# Patient Record
Sex: FEMALE | Race: BLACK OR AFRICAN AMERICAN | URBAN - METROPOLITAN AREA
[De-identification: names, ages, dates, MRNs, and addresses within clinical notes are randomized per-mention and may not be internally consistent; named-entity substitution may affect disease eponyms.]

---

## 2022-05-27 ENCOUNTER — OFFICE VISIT (OUTPATIENT)
Dept: INTERNAL MEDICINE CLINIC | Age: 32
End: 2022-05-27
Payer: COMMERCIAL

## 2022-05-27 VITALS
RESPIRATION RATE: 20 BRPM | OXYGEN SATURATION: 98 % | HEART RATE: 101 BPM | HEIGHT: 67 IN | SYSTOLIC BLOOD PRESSURE: 128 MMHG | BODY MASS INDEX: 38.6 KG/M2 | TEMPERATURE: 98.6 F | WEIGHT: 245.9 LBS | DIASTOLIC BLOOD PRESSURE: 77 MMHG

## 2022-05-27 DIAGNOSIS — G93.31 POSTVIRAL FATIGUE SYNDROME: ICD-10-CM

## 2022-05-27 DIAGNOSIS — R05.9 COUGH: Primary | ICD-10-CM

## 2022-05-27 DIAGNOSIS — L72.0 EPIDERMAL INCLUSION CYST: ICD-10-CM

## 2022-05-27 DIAGNOSIS — Z86.16 PERSONAL HISTORY OF COVID-19: ICD-10-CM

## 2022-05-27 DIAGNOSIS — E28.2 PCOS (POLYCYSTIC OVARIAN SYNDROME): ICD-10-CM

## 2022-05-27 DIAGNOSIS — J31.0 CHRONIC RHINITIS: ICD-10-CM

## 2022-05-27 DIAGNOSIS — R06.2 WHEEZING: ICD-10-CM

## 2022-05-27 DIAGNOSIS — Z83.2 FAMILY HISTORY OF SARCOIDOSIS: ICD-10-CM

## 2022-05-27 DIAGNOSIS — Z76.89 ESTABLISHING CARE WITH NEW DOCTOR, ENCOUNTER FOR: ICD-10-CM

## 2022-05-27 DIAGNOSIS — Z91.09 ENVIRONMENTAL ALLERGIES: ICD-10-CM

## 2022-05-27 DIAGNOSIS — Z79.899 ENCOUNTER FOR LONG-TERM (CURRENT) USE OF OTHER MEDICATIONS: ICD-10-CM

## 2022-05-27 PROCEDURE — 99204 OFFICE O/P NEW MOD 45 MIN: CPT | Performed by: FAMILY MEDICINE

## 2022-05-27 RX ORDER — MINERAL OIL
180 ENEMA (ML) RECTAL DAILY
COMMUNITY

## 2022-05-27 RX ORDER — NORETHINDRONE ACETATE AND ETHINYL ESTRADIOL 1MG-20(21)
1 KIT ORAL DAILY
COMMUNITY
Start: 2022-04-12 | End: 2022-06-13 | Stop reason: SDUPTHER

## 2022-05-27 RX ORDER — PREDNISONE 5 MG/1
TABLET ORAL
Qty: 21 TABLET | Refills: 0 | Status: SHIPPED | OUTPATIENT
Start: 2022-05-27 | End: 2022-06-13 | Stop reason: ALTCHOICE

## 2022-05-27 RX ORDER — FEXOFENADINE HCL 60 MG
180 TABLET ORAL DAILY
COMMUNITY
End: 2022-05-27

## 2022-05-27 NOTE — PROGRESS NOTES
Rm    Chief Complaint   Patient presents with    New Patient     est care    Allergies        Visit Vitals  /77 (BP 1 Location: Left upper arm, BP Patient Position: Sitting, BP Cuff Size: Large adult)   Pulse (!) 101   Temp 98.6 °F (37 °C) (Oral)   Resp 20   Ht 5' 7\" (1.702 m)   Wt 245 lb 14.4 oz (111.5 kg)   SpO2 98%   BMI 38.51 kg/m²        1. Have you been to the ER, urgent care clinic since your last visit? Hospitalized since your last visit? No    2. Have you seen or consulted any other health care providers outside of the [address] [place] [address] since your last visit? Include any pap smears or colon screening. No     Health Maintenance Due   Topic Date Due    Hepatitis C Screening  Never done    Depression Screen  Never done    COVID-19 Vaccine (1) Never done    Cervical cancer screen  Never done        3 most recent PHQ Screens 5/27/2022   Little interest or pleasure in doing things Not at all   Feeling down, depressed, irritable, or hopeless Not at all   Total Score PHQ 2 0        No flowsheet data found. No flowsheet data found.

## 2022-05-27 NOTE — PROGRESS NOTES
[facility]  Cesilia Parr MD  [address] [address]    Chief Complaint   Patient presents with    New Patient     est care    Allergies       SUBJECTIVE:    Narciso Harris is a 28 y.o. female for care establishment and evaluation of allergies. worsening allergy sx  Nasal congestion sneezing runny nose occasional wheezing-rare  Using Allegra 24, afrin- not overusing afrin  No acute sx. denies fever, chills,  facial pressure , headache  Trigger mgt ok  Allergy testing over 10 yr ago- dust, grass, other common allergens  No ent evaluation for possible nasal polyps    covid in January  Lingering symptoms include fatigue and coughing  Denies fever chills chest pain shortness of breath  Normal activities  Smokes marijuana  Mother  from sarcoidosis complications. Patient concerned about possible sarcoidosis       Knot on back x 1 yr  No change in size, no pain or irritation    December fall injured left leg, swelling subsided, c/w discoloration in area of injury    pcos history, irregular menstruation    Current Outpatient Medications   Medication Sig Dispense Refill    fexofenadine (ALLEGRA) 180 mg tablet Take 180 mg by mouth daily.  predniSONE (STERAPRED) 5 mg dose pack See administration instruction per 5mg dose pack 21 Tablet 0    Blisovi Fe /, 28, 1 mg-20 mcg (21)/75 mg (7) tab Take 1 Tablet by mouth daily. Past Medical History:   Diagnosis Date    Contact dermatitis and eczema due to cause     urticaria     History reviewed. No pertinent surgical history.   No Known Allergies    REVIEW OF SYSTEMS:  General: negative for - chills or fever  ENT: nasal congestionnegative for - headaches, , tinnitus, hearing loss, vision changes, sore throat  Respiratory: cough, wheezing; negative for - hemoptysis, shortness of breath or   Cardiovascular : negative for - chest pain, edema, palpitations or shortness of breath  Gastrointestinal: negative for - abdominal pain, blood in stools, heartburn or nausea/vomiting, diarrhea, constipation  Genito-Urinary: + Irregular menstruation ; no dysuria, trouble voiding, hematuria   Musculoskeletal: negative for - gait disturbance, joint pain, joint stiffness , joint swelling, muscle aches  Neurological: no TIA or stroke symptoms  Hematologic: no bruises, no bleeding, no swollen glands  Integument: +lumps, no mole changes, nail changes or rash  Endocrine:+/lethargy; no malaise or unexpected weight changes      Social History     Socioeconomic History    Marital status: SINGLE   Occupational History    Occupation:    Tobacco Use    Smoking status: Never Smoker    Smokeless tobacco: Never Used   Substance and Sexual Activity    Alcohol use: Yes     Alcohol/week: 2.0 standard drinks     Types: 2 Glasses of wine per week    Drug use: No    Sexual activity: Yes     Partners: Male     Birth control/protection: Condom     Family History   Problem Relation Age of Onset    Thyroid Cancer Mother     Heart Disease Father        OBJECTIVE:     Visit Vitals  /77 (BP 1 Location: Left upper arm, BP Patient Position: Sitting, BP Cuff Size: Large adult)   Pulse (!) 101   Temp 98.6 °F (37 °C) (Oral)   Resp 20   Ht 5' 7\" (1.702 m)   Wt 245 lb 14.4 oz (111.5 kg)   SpO2 98%   BMI 38.51 kg/m²     CONSTITUTIONAL: Pleasant cooperative in no acute distress  EYES: perrla, eom intact  ENMT:moist mucous membranes, pharynx: rt tonsil stone  NECK: supple. Thyroid normal  RESPIRATORY: Chest: clear bilaterally  CARDIOVASCULAR: Heart: regular rate and rhythm, pulse 2+   GASTROINTESTINAL: Abdomen: soft, bowel sounds active  HEMATOLOGIC: no pathological lymph nodes palpated  MUSCULOSKELETAL: Extremities: no edema,   INTEGUMENT: Nontender nonfluctuant SQ cystic lesion in upper back; no unusual rashes. Nails appear normal.  NEUROLOGIC: non-focal exam   MENTAL STATUS: alert and oriented, appropriate affect     ASSESSMENT/PLAN:  1. Cough    2.  PCOS (polycystic ovarian syndrome)    3. Encounter for long-term (current) use of other medications    4. Family history of sarcoidosis    5. Wheezing    6. Chronic rhinitis    7. Postviral fatigue syndrome    8. Environmental allergies    9. Personal history of COVID-19    10. Establishing care with new doctor, encounter for    11. Epidermal inclusion cyst      Cough is likely multifactorial given history is of allergic rhinitis, recent COVID infection and smoking history. Family history of sarcoidosis. Check ACE level. Patient defers on chest x-ray. Treat cough symptomatically. Smoking cessation will be helpful. Fatigue following COVID. Check labs and reassure. Epidermal inclusion cyst  Observe for now. Refer to Derm as needed. .  Orders Placed This Encounter    ANGIOTENSIN CONVERTING ENZYME    HEMOGLOBIN A1C WITH EAG    CBC WITH AUTOMATED DIFF    METABOLIC PANEL, COMPREHENSIVE    URINALYSIS W/ RFLX MICROSCOPIC    Blisovi Fe 1/20, 28, 1 mg-20 mcg (21)/75 mg (7) tab    DISCONTD: fexofenadine (ALLEGRA) 60 mg tablet    fexofenadine (ALLEGRA) 180 mg tablet    predniSONE (STERAPRED) 5 mg dose pack       Follow-up and Dispositions    · Return if symptoms worsen or fail to improve. I have discussed the diagnosis with the patient and the intended plan as seen in the  orders above. The patient understands and agrees with the plan. The patient has   received an after visit summary. Questions were answered concerning  future plans  Patient labs and/or xrays were reviewed as available. Past records were reviewed as available. Counseled regarding healthy lifestyle        Advised patient to  call back or return to office if symptoms develop/worsen/change/persist.  Discussed expected course/resolution/complications of diagnosis in detail with patient. Medication risks/benefits/costs/interactions/alternatives discussed with patient    Carrie Montes M.D.       This note was created using voice recognition software.   Edits have been made but syntax errors might exist.

## 2022-05-27 NOTE — PATIENT INSTRUCTIONS
Allergies: Care Instructions  Overview     Allergies occur when your body's defense system (immune system) overreacts to certain substances. The immune system treats a harmless substance as if it were a harmful germ or virus. Many things can make this happen. These include pollens, medicine, food, dust, animal dander, and mold. Allergies can be mild or severe. Mild allergies can be managed with home treatment. But medicine may be needed to prevent problems. Managing your allergies is an important part of staying healthy. Your doctor may suggest that you have allergy testing to help find out what is causing your allergies. Severe allergies can cause reactions that affect your whole body (anaphylactic reactions). Your doctor may prescribe a shot of epinephrine to carry with you in case you have a severe reaction. Learn how to give yourself the shot and keep it with you at all times. Make sure it is not . Follow-up care is a key part of your treatment and safety. Be sure to make and go to all appointments, and call your doctor if you are having problems. It's also a good idea to know your test results and keep a list of the medicines you take. How can you care for yourself at home? · If you have been told by your doctor that dust or dust mites are causing your allergy, decrease the dust around your bed:  ? Wash sheets, pillowcases, and other bedding in hot water every week. ? Use dust-proof covers for pillows, duvets, and mattresses. Avoid plastic covers because they tear easily and do not \"breathe. \" Wash as instructed on the label. ? Do not use any blankets and pillows that you do not need. ? Use blankets that you can wash in your washing machine. ? Consider removing drapes and carpets, which attract and hold dust, from your bedroom. · If you are allergic to house dust and mites, do not use home humidifiers. Your doctor can suggest ways you can control dust and mites.   · Look for signs of cockroaches. Cockroaches cause allergic reactions. Use cockroach baits to get rid of them. Then, clean your home well. Cockroaches like areas where grocery bags, newspapers, empty bottles, or cardboard boxes are stored. Do not keep these inside your home, and keep trash and food containers sealed. Seal off any spots where cockroaches might enter your home. · If you are allergic to mold, get rid of furniture, rugs, and drapes that smell musty. Check for mold in the bathroom. · If you are allergic to outdoor pollen or mold spores, use air-conditioning. Change or clean all filters every month. Keep windows closed. · If you are allergic to pollen, stay inside when pollen counts are high. Use a vacuum  with a HEPA filter or a double-thickness filter at least two times each week. · Stay inside when air pollution is bad. Avoid paint fumes, perfumes, and other strong odors. · Avoid conditions that make your allergies worse. Stay away from smoke. Do not smoke or let anyone else smoke in your house. Do not use fireplaces or wood-burning stoves. · If you are allergic to your pets, change the air filter in your furnace every month. Use high-efficiency filters. · If you are allergic to pet dander, keep pets outside or out of your bedroom. Old carpet and cloth furniture can hold a lot of animal dander. You may need to replace them. When should you call for help? Give an epinephrine shot if:    · You think you are having a severe allergic reaction.     · You have symptoms in more than one body area, such as mild nausea and an itchy mouth. After giving an epinephrine shot call 911, even if you feel better. Call 911 if:    · You have symptoms of a severe allergic reaction. These may include:  ? Sudden raised, red areas (hives) all over your body. ? Swelling of the throat, mouth, lips, or tongue. ? Trouble breathing. ? Passing out (losing consciousness).  Or you may feel very lightheaded or suddenly feel weak, confused, or restless.     · You have been given an epinephrine shot, even if you feel better. Call your doctor now or seek immediate medical care if:    · You have symptoms of an allergic reaction, such as:  ? A rash or hives (raised, red areas on the skin). ? Itching. ? Swelling. ? Belly pain, nausea, or vomiting. Watch closely for changes in your health, and be sure to contact your doctor if:    · You do not get better as expected. Where can you learn more? Go to http://www.barros.com/  Enter W171 in the search box to learn more about \"Allergies: Care Instructions. \"  Current as of: February 10, 2021               Content Version: 13.2  © 2006-2022 [employer]. Care instructions adapted under license by [employer] (which disclaims liability or warranty for this information). If you have questions about a medical condition or this instruction, always ask your healthcare professional. [place] [address] any warranty or liability for your use of this information. Prednisone (By mouth)   Prednisone (PRED-ni-sone)  Treats many diseases and conditions, especially problems related to inflammation. This medicine is a corticosteroid. Brand Name(s): Contrast Allergy PreMed Pack, Jazmyne, predniSONE Intensol   There may be other brand names for this medicine. When This Medicine Should Not Be Used: This medicine is not right for everyone. Do not use if you had an allergic reaction to prednisone or if you are pregnant. How to Use This Medicine:   Liquid, Tablet, Delayed Release Tablet  · Take your medicine as directed. Your dose may need to be changed several times to find what works best for you. · It is best to take this medicine with food or milk. · Swallow the delayed-release tablet whole. Do not crush, break, or chew it. · Measure the oral liquid medicine with a marked measuring spoon, oral syringe, or medicine cup. · Missed dose:  Take a dose as soon as you remember. If it is almost time for your next dose, wait until then and take a regular dose. Do not take extra medicine to make up for a missed dose. · Store the medicine in a closed container at room temperature, away from heat, moisture, and direct light. Do not freeze the oral liquid. Drugs and Foods to Avoid:   Ask your doctor or pharmacist before using any other medicine, including over-the-counter medicines, vitamins, and herbal products. · Tell your doctor if you use any of the following:  ¨ Aminoglutethimide, amphotericin B, carbamazepine, cholestyramine, cyclosporine, digoxin, isoniazid, ketoconazole, phenobarbital, phenytoin, or rifampin  ¨ Blood thinner, such as warfarin  ¨ NSAID pain or arthritis medicine, such as aspirin, diclofenac, ibuprofen, naproxen, celecoxib  ¨ Diuretic (water pill)  ¨ Diabetes medicine  ¨ Macrolide antibiotic, such as azithromycin, clarithromycin, erythromycin  ¨ Estrogen, including birth control pills or hormone replacement therapy  · This medicine may interfere with vaccines. Ask your doctor before you get a flu shot or any other vaccines. Warnings While Using This Medicine:   · It is not safe to take this medicine during pregnancy. It could harm an unborn baby. Tell your doctor right away if you become pregnant. · Tell your doctor if you are breastfeeding or if you have kidney problems, heart failure, high blood pressure, a recent heart attack, diabetes, glaucoma, osteoporosis, or thyroid problems. Tell your doctor about any infection you have. Also tell your doctor if you have had mental or emotional problems (such as depression) or stomach or bowel problems (such as an ulcer or diverticulitis).   · This medicine may cause the following problems:  ¨ Mood or behavior changes  ¨ Higher blood pressure, retaining water, changes in salt or potassium levels in your body  ¨ Cataracts or glaucoma (with long-term use)  ¨ Weak bones or osteoporosis (with long-term use)  ¨ Slow growth in children (with long-term use)  ¨ Muscle problems (with high doses, especially if you have myasthenia gravis or similar nerve and muscle problems)  · Do not stop using this medicine suddenly. Your doctor will need to slowly decrease your dose before you stop it completely. · This medicine could cause you to get infections more easily. Tell your doctor right away if you are exposed to chicken pox, measles, or other serious infection. Tell your doctor if you had a serious infection in the past, such as tuberculosis or herpes. · Tell your doctor about any extra stress or anxiety in your life. Your dose might need to be changed for a short time. · Tell any doctor or dentist who treats you that you are using this medicine. This medicine may affect certain medical test results. · Keep all medicine out of the reach of children. Never share your medicine with anyone. Possible Side Effects While Using This Medicine:   Call your doctor right away if you notice any of these side effects:  · Allergic reaction: Itching or hives, swelling in your face or hands, swelling or tingling in your mouth or throat, chest tightness, trouble breathing  · Dark freckles, skin color changes, coldness, weakness, tiredness, nausea, vomiting, weight loss  · Depression, unusual thoughts, feelings, or behaviors, trouble sleeping  · Fever, chills, cough, sore throat, and body aches  · Muscle pain or weakness  · Rapid weight gain, swelling in your hands, ankles, or feet  · Severe stomach pain, nausea, vomiting, or red or black stools  · Skin changes or growths  · Trouble seeing, eye pain, headache  If you notice these less serious side effects, talk with your doctor:   · Increased appetite  · Round, puffy face  · Weight gain around your neck, upper back, breast, face, or waist  If you notice other side effects that you think are caused by this medicine, tell your doctor.    Call your doctor for medical advice about side effects. You may report side effects to FDA at 8-026-NFO-0584  © 2017 [facility] Information is for End User's use only and may not be sold, redistributed or otherwise used for commercial purposes. The above information is an  only. It is not intended as medical advice for individual conditions or treatments. Talk to your doctor, nurse or pharmacist before following any medical regimen to see if it is safe and effective for you.

## 2022-05-28 LAB
ALBUMIN SERPL-MCNC: 4 G/DL (ref 3.5–5)
ALBUMIN/GLOB SERPL: 1.1 {RATIO} (ref 1.1–2.2)
ALP SERPL-CCNC: 72 U/L (ref 45–117)
ALT SERPL-CCNC: 31 U/L (ref 12–78)
ANION GAP SERPL CALC-SCNC: 7 MMOL/L (ref 5–15)
APPEARANCE UR: CLEAR
AST SERPL-CCNC: 24 U/L (ref 15–37)
BASOPHILS # BLD: 0.1 K/UL (ref 0–0.1)
BASOPHILS NFR BLD: 1 % (ref 0–1)
BILIRUB SERPL-MCNC: 0.3 MG/DL (ref 0.2–1)
BILIRUB UR QL: NEGATIVE
BUN SERPL-MCNC: 9 MG/DL (ref 6–20)
BUN/CREAT SERPL: 12 (ref 12–20)
CALCIUM SERPL-MCNC: 9.3 MG/DL (ref 8.5–10.1)
CHLORIDE SERPL-SCNC: 103 MMOL/L (ref 97–108)
CO2 SERPL-SCNC: 26 MMOL/L (ref 21–32)
COLOR UR: NORMAL
CREAT SERPL-MCNC: 0.75 MG/DL (ref 0.55–1.02)
DIFFERENTIAL METHOD BLD: NORMAL
EOSINOPHIL # BLD: 0.4 K/UL (ref 0–0.4)
EOSINOPHIL NFR BLD: 5 % (ref 0–7)
ERYTHROCYTE [DISTWIDTH] IN BLOOD BY AUTOMATED COUNT: 14.2 % (ref 11.5–14.5)
EST. AVERAGE GLUCOSE BLD GHB EST-MCNC: 117 MG/DL
GLOBULIN SER CALC-MCNC: 3.7 G/DL (ref 2–4)
GLUCOSE SERPL-MCNC: 90 MG/DL (ref 65–100)
GLUCOSE UR STRIP.AUTO-MCNC: NEGATIVE MG/DL
HBA1C MFR BLD: 5.7 % (ref 4–5.6)
HCT VFR BLD AUTO: 45.4 % (ref 35–47)
HGB BLD-MCNC: 14.3 G/DL (ref 11.5–16)
HGB UR QL STRIP: NEGATIVE
IMM GRANULOCYTES # BLD AUTO: 0 K/UL (ref 0–0.04)
IMM GRANULOCYTES NFR BLD AUTO: 0 % (ref 0–0.5)
KETONES UR QL STRIP.AUTO: NEGATIVE MG/DL
LEUKOCYTE ESTERASE UR QL STRIP.AUTO: NEGATIVE
LYMPHOCYTES # BLD: 2.2 K/UL (ref 0.8–3.5)
LYMPHOCYTES NFR BLD: 26 % (ref 12–49)
MCH RBC QN AUTO: 28 PG (ref 26–34)
MCHC RBC AUTO-ENTMCNC: 31.5 G/DL (ref 30–36.5)
MCV RBC AUTO: 88.8 FL (ref 80–99)
MONOCYTES # BLD: 0.7 K/UL (ref 0–1)
MONOCYTES NFR BLD: 9 % (ref 5–13)
NEUTS SEG # BLD: 4.9 K/UL (ref 1.8–8)
NEUTS SEG NFR BLD: 59 % (ref 32–75)
NITRITE UR QL STRIP.AUTO: NEGATIVE
NRBC # BLD: 0 K/UL (ref 0–0.01)
NRBC BLD-RTO: 0 PER 100 WBC
PH UR STRIP: 5.5 [PH] (ref 5–8)
PLATELET # BLD AUTO: 352 K/UL (ref 150–400)
PMV BLD AUTO: 9.5 FL (ref 8.9–12.9)
POTASSIUM SERPL-SCNC: 4.3 MMOL/L (ref 3.5–5.1)
PROT SERPL-MCNC: 7.7 G/DL (ref 6.4–8.2)
PROT UR STRIP-MCNC: NEGATIVE MG/DL
RBC # BLD AUTO: 5.11 M/UL (ref 3.8–5.2)
SODIUM SERPL-SCNC: 136 MMOL/L (ref 136–145)
SP GR UR REFRACTOMETRY: 1.01 (ref 1–1.03)
UROBILINOGEN UR QL STRIP.AUTO: 0.2 EU/DL (ref 0.2–1)
WBC # BLD AUTO: 8.3 K/UL (ref 3.6–11)

## 2022-05-29 LAB — ACE SERPL-CCNC: 37 U/L (ref 14–82)

## 2022-06-13 ENCOUNTER — OFFICE VISIT (OUTPATIENT)
Dept: OBGYN CLINIC | Age: 32
End: 2022-06-13
Payer: COMMERCIAL

## 2022-06-13 VITALS
DIASTOLIC BLOOD PRESSURE: 80 MMHG | SYSTOLIC BLOOD PRESSURE: 128 MMHG | BODY MASS INDEX: 37.98 KG/M2 | WEIGHT: 242 LBS | HEIGHT: 67 IN

## 2022-06-13 DIAGNOSIS — Z01.419 ENCOUNTER FOR GYNECOLOGICAL EXAMINATION WITHOUT ABNORMAL FINDING: Primary | ICD-10-CM

## 2022-06-13 PROCEDURE — 99385 PREV VISIT NEW AGE 18-39: CPT | Performed by: ADVANCED PRACTICE MIDWIFE

## 2022-06-13 RX ORDER — NORETHINDRONE ACETATE AND ETHINYL ESTRADIOL 1MG-20(21)
1 KIT ORAL DAILY
Qty: 3 DOSE PACK | Refills: 4 | Status: SHIPPED | OUTPATIENT
Start: 2022-06-13

## 2022-06-13 NOTE — PROGRESS NOTES
Annual exam ages 21-44    Ezio Fuchs is a No obstetric history on file. ,  28 y.o. female   No LMP recorded. She presents for her annual checkup. She is having no significant problems. Hx PCOS well controlled with Cont dosing OCP    With regard to the Gardasil vaccine, she declines to receive it. Menstrual status:    Her periods are absent in flow. She is using no pads or tampons per day, very light to non existent due to contraceptive hormones. She does not have dysmenorrhea. She reports no premenstrual symptoms. Contraception:    The current method of family planning is OCP. Continuous    Sexual history:    She  reports being sexually active and has had partner(s) who are male. She reports using the following method of birth control/protection: Condom. Medical conditions:    Since her last annual GYN exam about one year ago, she has had the following changes in her health history: none. Surgical history confirmed with patient. has no past surgical history on file. Pap and Mammogram History:    Her most recent Pap smear was normal, obtained 1 year(s) ago. The patient Had a Mammo ~ 3 yrs ago, normal, no f/u needed. Breast Cancer History/Substance Abuse: negative/Paternal Great Aunt BrCa    Past Medical History:   Diagnosis Date    Contact dermatitis and eczema due to cause     urticaria     History reviewed. No pertinent surgical history. Current Outpatient Medications   Medication Sig Dispense Refill    Blisovi Fe 1/20, 28, 1 mg-20 mcg (21)/75 mg (7) tab Take 1 Tablet by mouth daily.  fexofenadine (ALLEGRA) 180 mg tablet Take 180 mg by mouth daily.  predniSONE (STERAPRED) 5 mg dose pack See administration instruction per 5mg dose pack (Patient not taking: Reported on 6/13/2022) 21 Tablet 0     Allergies: Patient has no known allergies. Tobacco History:  reports that she has never smoked.  She has never used smokeless tobacco.  Alcohol Abuse:  reports current alcohol use of about 2.0 standard drinks of alcohol per week. Drug Abuse:  reports no history of drug use. Family Medical/Cancer History:   Family History   Problem Relation Age of Onset    Thyroid Cancer Mother     Heart Disease Father         Review of Systems - History obtained from the patient  Constitutional: negative for weight loss, fever, night sweats  HEENT: negative for hearing loss, earache, congestion, snoring, sorethroat  CV: negative for chest pain, palpitations, edema  Resp: negative for cough, shortness of breath, wheezing  GI: negative for change in bowel habits, abdominal pain, black or bloody stools  : negative for frequency, dysuria, hematuria, vaginal discharge  MSK: negative for back pain, joint pain, muscle pain  Breast: negative for breast lumps, nipple discharge, galactorrhea  Skin :negative for itching, rash, hives  Neuro: negative for dizziness, headache, confusion, weakness  Psych: negative for anxiety, depression, change in mood  Heme/lymph: negative for bleeding, bruising, pallor    Physical Exam    Visit Vitals  /80   Ht 5' 7\" (1.702 m)   Wt 242 lb (109.8 kg)   BMI 37.90 kg/m²       Constitutional  · Appearance: well-nourished, well developed, alert, in no acute distress    HENT  · Head and Face: appears normal,   · Eyes: Sclera clear. Conjunctiva pink, no drainage.  PEARLA      Neck  · Inspection/Palpation: normal appearance, no masses or tenderness  · Lymph Nodes: no lymphadenopathy present  · Thyroid: NSSC, NT    Chest  · Respiratory Effort: breathing unlabored  · Auscultation: Slight Wheezes throughout    Cardiovascular  · Heart:  · Auscultation: regular rate and rhythm without murmur    Breasts  · Inspection of Breasts: breasts symmetrical, no skin changes, no discharge present, nipple appearance normal, no skin retraction present  · Palpation of Breasts and Axillae: no masses present on palpation, no breast tenderness  · Axillary Lymph Nodes: no lymphadenopathy present    Gastrointestinal  · Abdominal Examination: abdomen non-tender to palpation, no masses present  · Hernias: no hernias identified  · CVA: Neg/NT Bilat      Skin  · General Inspection: no rash, no lesions identified    Neurologic/Psychiatric  · Mental Status:  · Orientation: grossly oriented to person, place and time  · Mood and Affect: mood normal, affect appropriate    . Assessment:  Routine gynecologic examination  Her current medical status is satisfactory with no evidence of significant gynecologic issues. Bilat Wheezes, reports has appt with PCP severe asthma  Plan:  Declines Gardasil and STI testing  Blisovi 3 pks 3 RF (continuous dosing)  Counseled re: diet, exercise, healthy lifestyle, safe sex practices and STI prevention  Offered LARC for Contraception  Return for yearly wellness visits or prn  Gardisil counseling provided  Pt counseled regarding co-testing for high risk HPV with pap  Mammogram Screening recommendation starting age 36   Colorectal Screening recommendation starting age 39    [address]

## 2022-06-13 NOTE — PATIENT INSTRUCTIONS
Well Visit, Ages 25 to 48: Care Instructions  Overview     Well visits can help you stay healthy. Your doctor has checked your overall health and may have suggested ways to take good care of yourself. Your doctor also may have recommended tests. At home, you can help prevent illness with healthy eating, regular exercise, and other steps. Follow-up care is a key part of your treatment and safety. Be sure to make and go to all appointments, and call your doctor if you are having problems. It's also a good idea to know your test results and keep a list of the medicines you take. How can you care for yourself at home? · Get screening tests that you and your doctor decide on. Screening helps find diseases before any symptoms appear. · Eat healthy foods. Choose fruits, vegetables, whole grains, protein, and low-fat dairy foods. Limit fat, especially saturated fat. Reduce salt in your diet. · Limit alcohol. If you are a man, have no more than 2 drinks a day or 14 drinks a week. If you are a woman, have no more than 1 drink a day or 7 drinks a week. · Get at least 30 minutes of physical activity on most days of the week. Walking is a good choice. You also may want to do other activities, such as running, swimming, cycling, or playing tennis or team sports. Discuss any changes in your exercise program with your doctor. · Reach and stay at a healthy weight. This will lower your risk for many problems, such as obesity, diabetes, heart disease, and high blood pressure. · Do not smoke or allow others to smoke around you. If you need help quitting, talk to your doctor about stop-smoking programs and medicines. These can increase your chances of quitting for good. · Care for your mental health. It is easy to get weighed down by worry and stress. Learn strategies to manage stress, like deep breathing and mindfulness, and stay connected with your family and community.  If you find you often feel sad or hopeless, talk with your doctor. Treatment can help. · Talk to your doctor about whether you have any risk factors for sexually transmitted infections (STIs). You can help prevent STIs if you wait to have sex with a new partner (or partners) until you've each been tested for STIs. It also helps if you use condoms (male or female condoms) and if you limit your sex partners to one person who only has sex with you. Vaccines are available for some STIs, such as HPV. · Use birth control if it's important to you to prevent pregnancy. Talk with your doctor about the choices available and what might be best for you. · If you think you may have a problem with alcohol or drug use, talk to your doctor. This includes prescription medicines (such as amphetamines and opioids) and illegal drugs (such as cocaine and methamphetamine). Your doctor can help you figure out what type of treatment is best for you. · Protect your skin from too much sun. When you're outdoors from 10 a.m. to 4 p.m., stay in the shade or cover up with clothing and a hat with a wide brim. Wear sunglasses that block UV rays. Even when it's cloudy, put broad-spectrum sunscreen (SPF 30 or higher) on any exposed skin. · See a dentist one or two times a year for checkups and to have your teeth cleaned. · Wear a seat belt in the car. When should you call for help? Watch closely for changes in your health, and be sure to contact your doctor if you have any problems or symptoms that concern you. Where can you learn more? Go to http://www.[employer].com/  Enter P072 in the search box to learn more about \"Well Visit, Ages 25 to 48: Care Instructions. \"  Current as of: October 6, 2021               Content Version: 13.2  © 0871-0391 [employer], [employer]. Care instructions adapted under license by "[employer] (which disclaims liability or warranty for this information).  If you have questions about a medical condition or this instruction, always ask your healthcare professional. [place] [address] any warranty or liability for your use of this information.

## 2022-06-24 ENCOUNTER — OFFICE VISIT (OUTPATIENT)
Dept: INTERNAL MEDICINE CLINIC | Age: 32
End: 2022-06-24
Payer: COMMERCIAL

## 2022-06-24 VITALS
DIASTOLIC BLOOD PRESSURE: 87 MMHG | TEMPERATURE: 98.4 F | HEART RATE: 92 BPM | RESPIRATION RATE: 16 BRPM | HEIGHT: 67 IN | OXYGEN SATURATION: 98 % | BODY MASS INDEX: 38.67 KG/M2 | WEIGHT: 246.4 LBS | SYSTOLIC BLOOD PRESSURE: 124 MMHG

## 2022-06-24 DIAGNOSIS — Z00.00 WELLNESS EXAMINATION: Primary | ICD-10-CM

## 2022-06-24 PROCEDURE — 99395 PREV VISIT EST AGE 18-39: CPT | Performed by: FAMILY MEDICINE

## 2022-06-24 NOTE — PROGRESS NOTES
Chief Complaint   Patient presents with    Physical         1. Have you been to the ER, urgent care clinic since your last visit? Hospitalized since your last visit? No    2. Have you seen or consulted any other health care providers outside of the [address] [place] [address] since your last visit? Include any pap smears or colon screening.  No

## 2022-06-24 NOTE — PROGRESS NOTES
[facility]  Francisco Parr MD  [address]  [id number]    Chief Complaint   Patient presents with    Physical       SUBJECTIVE:    Tatianna Nava is a 28 y.o. female for cpe. LMP : amenorrheic while taking ocp  Last Pap (q 3 years, or q5 with HPV) :   Hx of abnl Pap - No  Last Mammogram (50-74 biennially): yes  Hx abnl mammogram: no    Contraception: ocp    1. Do you follow a low fat diet? yes  2. Are you up to date on your Tdap (<10 years)?  no  3. Have you ever had a Pneumovax vaccine (>65)  na              PCV13               PPSV23   4. Have you had Zoster vaccine (>50)? na  5. Have you had the HPV - Gardasil (13- 46)? no  6. Have you had a hepatitis C virus screening test? no  7. Do you smoke?  no  8. Do you consider yourself overweight?  yes  9. Is there a family history of CAD< age 48? no   Dad age 61 ami  8. Is there a family history of Cancer?  no  11. Do you know your Cancer risks?  no  12. Have you had a colonoscopy? (45-74yo)? na  13. Have you been tested for HIV or other STI's?  (18-71 y/o)? yes  14. Have had a bone density scan or DEXA done(>65)? na  15. Have you had an EKG performed in the last five years (>50)? na     Current Outpatient Medications   Medication Sig Dispense Refill    Blisovi Fe 1/20, 28, 1 mg-20 mcg (21)/75 mg (7) tab Take 1 Tablet by mouth daily. Indications: polycystic ovarian syndrome, a disease with cysts in the ovaries 3 Dose Pack 4    fexofenadine (ALLEGRA) 180 mg tablet Take 180 mg by mouth daily. Past Medical History:   Diagnosis Date    Contact dermatitis and eczema due to cause     urticaria     History reviewed. No pertinent surgical history.   No Known Allergies    REVIEW OF SYSTEMS:  General: negative for - chills or fever  ENT: negative for - headaches, nasal congestion, tinnitus, hearing loss, vision changes, sore throat  Respiratory: negative for - cough, hemoptysis, shortness of breath or wheezing  Cardiovascular : negative for - chest pain, edema, palpitations or shortness of breath  Gastrointestinal: negative for - abdominal pain, blood in stools, heartburn or nausea/vomiting, diarrhea, constipation  Genito-Urinary: no dysuria, trouble voiding, hematuria   Musculoskeletal: negative for - gait disturbance, joint pain, joint stiffness , joint swelling, muscle aches  Neurological: no TIA or stroke symptoms  Hematologic: no bruises, no bleeding, no swollen glands  Integument: no lumps, mole changes, nail changes or rash  Endocrine:no malaise/lethargy or unexpected weight changes      Social History     Socioeconomic History    Marital status: SINGLE   Occupational History    Occupation:    Tobacco Use    Smoking status: Never Smoker    Smokeless tobacco: Never Used   Substance and Sexual Activity    Alcohol use: Yes     Alcohol/week: 2.0 standard drinks     Types: 2 Glasses of wine per week    Drug use: No    Sexual activity: Yes     Partners: Male     Birth control/protection: Condom     Family History   Problem Relation Age of Onset    Thyroid Cancer Mother     Heart Disease Father        OBJECTIVE:     Visit Vitals  /87   Pulse 92   Temp 98.4 °F (36.9 °C) (Oral)   Resp 16   Ht 5' 7\" (1.702 m)   Wt 246 lb 6.4 oz (111.8 kg)   LMP  (LMP Unknown)   SpO2 98%   BMI 38.59 kg/m²     General appearance: alert, cooperative, no distress, appears stated age  Head: Normocephalic, without obvious abnormality, atraumatic  Eyes: conjunctivae/corneas clear. PERRL, EOM's intact. Ears: normal TM's and external ear canals AU  Nose: Nares normal. Septum midline. Mucosa normal. No drainage or sinus tenderness. Throat: Lips, mucosa, and tongue normal. Teeth and gums normal  Neck: supple, symmetrical, trachea midline, no adenopathy, thyroid: not enlarged, symmetric, no tenderness/mass/nodules, no carotid bruit and no JVD  Back: negative, symmetric, no curvature.  ROM normal. No CVA tenderness. Lungs: clear to auscultation bilaterally  Breasts: not examined  Heart: regular rate and rhythm, S1, S2 normal, no murmur, click, rub or gallop  Abdomen: soft, non-tender. Bowel sounds normal. No masses,  no organomegaly  Pelvic: deferred  Extremities: extremities normal, atraumatic, no cyanosis or edema  Pulses: 2+ and symmetric  Skin: Skin color, texture, turgor normal. No rashes or lesions  Lymph nodes: Cervical, supraclavicular, and axillary nodes normal.  Neurologic: Alert and oriented X 3, normal strength and tone. Normal symmetric reflexes. Normal coordination and gait      ASSESSMENT/PLAN:  1. Wellness examination    2. BMI 38.0-38.9,adult      . Follow-up and Dispositions    · Return in about 1 year (around 6/24/2023) for annual wellness exam.        I have discussed the diagnosis with the patient and the intended plan as seen in the  orders above. The patient understands and agrees with the plan. The patient has   received an after visit summary. Questions were answered concerning  future plans  Patient labs and/or xrays were reviewed as available. Past records were reviewed as available. Counseled regarding diet, exercise and healthy lifestyle        Signed,  Jake Zhou M.D. This note was created using voice recognition software.   Edits have been made but syntax errors might exist.

## 2023-05-23 RX ORDER — FEXOFENADINE HCL 180 MG/1
180 TABLET ORAL DAILY
COMMUNITY

## 2023-05-23 RX ORDER — NORETHINDRONE ACETATE AND ETHINYL ESTRADIOL 1MG-20(21)
1 KIT ORAL DAILY
COMMUNITY
Start: 2022-06-13

## 2023-08-17 RX ORDER — NORETHINDRONE ACETATE AND ETHINYL ESTRADIOL 1MG-20(21)
KIT ORAL
Qty: 84 TABLET | OUTPATIENT
Start: 2023-08-17

## 2024-06-15 VITALS
BODY MASS INDEX: 39.38 KG/M2 | WEIGHT: 250.88 LBS | HEART RATE: 100 BPM | TEMPERATURE: 98.7 F | SYSTOLIC BLOOD PRESSURE: 151 MMHG | OXYGEN SATURATION: 100 % | DIASTOLIC BLOOD PRESSURE: 95 MMHG | HEIGHT: 67 IN | RESPIRATION RATE: 16 BRPM

## 2024-06-15 PROCEDURE — 93005 ELECTROCARDIOGRAM TRACING: CPT | Performed by: EMERGENCY MEDICINE

## 2024-06-15 PROCEDURE — 99283 EMERGENCY DEPT VISIT LOW MDM: CPT

## 2024-06-15 ASSESSMENT — PAIN DESCRIPTION - FREQUENCY: FREQUENCY: CONTINUOUS

## 2024-06-15 ASSESSMENT — PAIN DESCRIPTION - PAIN TYPE: TYPE: ACUTE PAIN

## 2024-06-15 ASSESSMENT — PAIN DESCRIPTION - ORIENTATION: ORIENTATION: MID

## 2024-06-15 ASSESSMENT — PAIN DESCRIPTION - ONSET: ONSET: SUDDEN

## 2024-06-15 ASSESSMENT — PAIN DESCRIPTION - LOCATION: LOCATION: ABDOMEN;CHEST

## 2024-06-15 ASSESSMENT — PAIN DESCRIPTION - DESCRIPTORS: DESCRIPTORS: PRESSURE

## 2024-06-15 ASSESSMENT — PAIN - FUNCTIONAL ASSESSMENT
PAIN_FUNCTIONAL_ASSESSMENT: ACTIVITIES ARE NOT PREVENTED
PAIN_FUNCTIONAL_ASSESSMENT: 0-10

## 2024-06-15 ASSESSMENT — PAIN SCALES - GENERAL: PAINLEVEL_OUTOF10: 6

## 2024-06-16 ENCOUNTER — HOSPITAL ENCOUNTER (EMERGENCY)
Facility: HOSPITAL | Age: 34
Discharge: HOME | End: 2024-06-16
Attending: EMERGENCY MEDICINE

## 2024-06-16 DIAGNOSIS — R09.A2 GLOBUS SENSATION: Primary | ICD-10-CM

## 2024-06-16 LAB
EKG ATRIAL RATE: 99 BPM
EKG DIAGNOSIS: NORMAL
EKG P AXIS: 73 DEGREES
EKG P-R INTERVAL: 128 MS
EKG Q-T INTERVAL: 350 MS
EKG QRS DURATION: 74 MS
EKG QTC CALCULATION (BAZETT): 449 MS
EKG R AXIS: 68 DEGREES
EKG T AXIS: 59 DEGREES
EKG VENTRICULAR RATE: 99 BPM

## 2024-06-16 PROCEDURE — 93010 ELECTROCARDIOGRAM REPORT: CPT | Performed by: INTERNAL MEDICINE

## 2024-06-16 PROCEDURE — 6370000000 HC RX 637 (ALT 250 FOR IP): Performed by: STUDENT IN AN ORGANIZED HEALTH CARE EDUCATION/TRAINING PROGRAM

## 2024-06-16 RX ORDER — OMEPRAZOLE 40 MG/1
40 CAPSULE, DELAYED RELEASE ORAL
Qty: 15 CAPSULE | Refills: 0 | Status: SHIPPED | OUTPATIENT
Start: 2024-06-16 | End: 2024-07-01

## 2024-06-16 RX ADMIN — ALUMINUM HYDROXIDE, MAGNESIUM HYDROXIDE, AND SIMETHICONE 40 ML: 1200; 120; 1200 SUSPENSION ORAL at 01:05

## 2024-06-16 NOTE — ED TRIAGE NOTES
Pt ambulatory into triage after she ate dinner tonight around 8pm. Pt was eating steak, potatoes and green beans. Since then she has felt like it hasn't gone all the way down. She says that she swallowed hard and felt it get stuck. Pt says she thinks it may have gone down a little bit more. She tried some water and seltzer and was able to keep it down. Pt denies difficulty breathing and/or drooling. She says it comes in waves and feels like pressure in her diaphragm.    Loretta LINDSEY assessing in triage

## 2024-06-16 NOTE — ED PROVIDER NOTES
[facility]  EMERGENCY DEPARTMENT ENCOUNTER      Pt Name: Rika Jay  MRN: [id number]  Birthdate 1990  Date of evaluation: 6/15/2024  Provider: CÉSAR WINSTON    CHIEF COMPLAINT       Chief Complaint   Patient presents with    Chest Pain         HISTORY OF PRESENT ILLNESS   (Location/Symptom, Timing/Onset, Context/Setting, Quality, Duration, Modifying Factors, Severity)  Note limiting factors.   33-year-old female presents ED with globus sensation.  Patient reports that around 8 PM tonight she was eating dinner with steak, potatoes and green beans.  After eating she feels like she has a food bolus that has not \"gone all the way down\".  She reports that she feels like something is stuck in her throat.  She reports since then she has tried water and seltzer and was able to keep it down and is tolerating her secretions but the sensation is still there.  She denies any shortness of breath, chest pain, cough, nausea or vomiting.            Review of External Medical Records:     Nursing Notes were reviewed.    REVIEW OF SYSTEMS    (2-9 systems for level 4, 10 or more for level 5)     Review of Systems   Constitutional:  Negative for chills and fever.   HENT:  Negative for congestion, ear pain and sore throat.    Eyes:  Negative for pain.   Respiratory:  Negative for cough and shortness of breath.    Cardiovascular:  Negative for chest pain.   Gastrointestinal:  Negative for abdominal pain, diarrhea, nausea and vomiting.   Genitourinary:  Negative for dysuria and flank pain.   Musculoskeletal:  Negative for myalgias.   Skin:  Negative for rash.   Neurological:  Negative for dizziness and headaches.   Hematological:  Negative for adenopathy.       Except as noted above the remainder of the review of systems was reviewed and negative.       PAST MEDICAL HISTORY     Past Medical History:   Diagnosis Date    Contact dermatitis and eczema due to cause     urticaria         SURGICAL HISTORY     No past  Asthma    Benign hypertension    Cigarette smoker    GERD (gastroesophageal reflux disease)    H/O abdominal hysterectomy    HIV disease    HPV (human papilloma virus) infection    Osteoarthritis    Pruritus  (chronic)